# Patient Record
Sex: FEMALE | Race: WHITE | NOT HISPANIC OR LATINO | Employment: OTHER | URBAN - METROPOLITAN AREA
[De-identification: names, ages, dates, MRNs, and addresses within clinical notes are randomized per-mention and may not be internally consistent; named-entity substitution may affect disease eponyms.]

---

## 2023-10-19 ENCOUNTER — HOSPITAL ENCOUNTER (OUTPATIENT)
Dept: CARDIOLOGY | Facility: HOSPITAL | Age: 73
Discharge: HOME | End: 2023-10-19
Payer: MEDICARE

## 2023-10-19 DIAGNOSIS — Z95.818 PRESENCE OF OTHER CARDIAC IMPLANTS AND GRAFTS: ICD-10-CM

## 2023-10-19 DIAGNOSIS — R55 SYNCOPE AND COLLAPSE: ICD-10-CM

## 2023-10-19 DIAGNOSIS — Z95.818 PRESENCE OF OTHER CARDIAC IMPLANTS AND GRAFTS: Primary | ICD-10-CM

## 2023-10-19 PROCEDURE — 93298 REM INTERROG DEV EVAL SCRMS: CPT | Performed by: INTERNAL MEDICINE

## 2023-12-06 ENCOUNTER — OFFICE VISIT (OUTPATIENT)
Dept: CARDIOLOGY | Facility: CLINIC | Age: 73
End: 2023-12-06
Payer: MEDICARE

## 2023-12-06 ENCOUNTER — HOSPITAL ENCOUNTER (OUTPATIENT)
Dept: CARDIOLOGY | Facility: HOSPITAL | Age: 73
Discharge: HOME | End: 2023-12-06
Payer: MEDICARE

## 2023-12-06 VITALS
BODY MASS INDEX: 32.49 KG/M2 | HEART RATE: 59 BPM | SYSTOLIC BLOOD PRESSURE: 118 MMHG | HEIGHT: 61 IN | DIASTOLIC BLOOD PRESSURE: 64 MMHG

## 2023-12-06 DIAGNOSIS — Z95.818 PRESENCE OF OTHER CARDIAC IMPLANTS AND GRAFTS: ICD-10-CM

## 2023-12-06 DIAGNOSIS — F17.200 SMOKER: ICD-10-CM

## 2023-12-06 DIAGNOSIS — R55 SYNCOPE AND COLLAPSE: ICD-10-CM

## 2023-12-06 DIAGNOSIS — Z95.818 STATUS POST PLACEMENT OF IMPLANTABLE LOOP RECORDER: Primary | ICD-10-CM

## 2023-12-06 DIAGNOSIS — T50.1X5D LOOP DIURETIC CAUSING ADVERSE EFFECT IN THERAPEUTIC USE, SUBSEQUENT ENCOUNTER: ICD-10-CM

## 2023-12-06 PROBLEM — E53.8 VITAMIN B12 DEFICIENCY: Status: ACTIVE | Noted: 2019-12-04

## 2023-12-06 PROBLEM — C44.321 SQUAMOUS CELL CARCINOMA OF NOSE: Status: ACTIVE | Noted: 2019-05-09

## 2023-12-06 PROBLEM — I10 ESSENTIAL HYPERTENSION: Status: ACTIVE | Noted: 2019-12-04

## 2023-12-06 PROBLEM — G40.909 EPILEPSY (MULTI): Status: ACTIVE | Noted: 2020-09-28

## 2023-12-06 PROBLEM — R45.4 DIFFICULTY CONTROLLING ANGER: Status: ACTIVE | Noted: 2019-12-18

## 2023-12-06 PROBLEM — G31.84 COGNITIVE IMPAIRMENT, MILD, SO STATED: Status: ACTIVE | Noted: 2019-12-04

## 2023-12-06 PROBLEM — G40.209 PARTIAL EPILEPSY WITH IMPAIRMENT OF CONSCIOUSNESS (MULTI): Status: ACTIVE | Noted: 2023-12-06

## 2023-12-06 PROBLEM — G47.33 OBSTRUCTIVE SLEEP APNEA SYNDROME: Status: ACTIVE | Noted: 2018-11-16

## 2023-12-06 PROBLEM — E55.9 VITAMIN D DEFICIENCY: Status: ACTIVE | Noted: 2019-12-04

## 2023-12-06 PROBLEM — E66.9 OBESITY (BMI 30-39.9): Status: ACTIVE | Noted: 2017-11-29

## 2023-12-06 PROBLEM — E78.2 MIXED HYPERLIPIDEMIA: Status: ACTIVE | Noted: 2019-12-04

## 2023-12-06 PROCEDURE — 93291 INTERROG DEV EVAL SCRMS IP: CPT | Performed by: INTERNAL MEDICINE

## 2023-12-06 PROCEDURE — 3074F SYST BP LT 130 MM HG: CPT | Performed by: INTERNAL MEDICINE

## 2023-12-06 PROCEDURE — 3078F DIAST BP <80 MM HG: CPT | Performed by: INTERNAL MEDICINE

## 2023-12-06 PROCEDURE — 93000 ELECTROCARDIOGRAM COMPLETE: CPT | Performed by: INTERNAL MEDICINE

## 2023-12-06 PROCEDURE — 99214 OFFICE O/P EST MOD 30 MIN: CPT | Performed by: INTERNAL MEDICINE

## 2023-12-06 PROCEDURE — 3008F BODY MASS INDEX DOCD: CPT | Performed by: INTERNAL MEDICINE

## 2023-12-06 PROCEDURE — 93291 INTERROG DEV EVAL SCRMS IP: CPT

## 2023-12-06 RX ORDER — ERGOCALCIFEROL 1.25 MG/1
1.25 CAPSULE ORAL
COMMUNITY

## 2023-12-06 RX ORDER — NITROGLYCERIN 0.4 MG/1
0.4 TABLET SUBLINGUAL
COMMUNITY
Start: 2022-02-28

## 2023-12-06 RX ORDER — ASPIRIN 81 MG/1
1 TABLET ORAL DAILY
COMMUNITY

## 2023-12-06 RX ORDER — AMLODIPINE BESYLATE 5 MG/1
5 TABLET ORAL DAILY
COMMUNITY

## 2023-12-06 RX ORDER — LANOLIN ALCOHOL/MO/W.PET/CERES
1000 CREAM (GRAM) TOPICAL
COMMUNITY

## 2023-12-06 RX ORDER — ALBUTEROL SULFATE 90 UG/1
2 AEROSOL, METERED RESPIRATORY (INHALATION) EVERY 6 HOURS PRN
COMMUNITY
Start: 2022-11-29

## 2023-12-06 RX ORDER — BUDESONIDE AND FORMOTEROL FUMARATE DIHYDRATE 160; 4.5 UG/1; UG/1
2 AEROSOL RESPIRATORY (INHALATION) 2 TIMES DAILY
COMMUNITY
Start: 2023-09-27

## 2023-12-06 RX ORDER — LEVETIRACETAM 750 MG/1
2250 TABLET ORAL 2 TIMES DAILY
COMMUNITY
Start: 2023-03-10

## 2023-12-06 NOTE — PROGRESS NOTES
CARDIOLOGY OFFICE VISIT      CHIEF COMPLAINT  Chief Complaint   Patient presents with    Syncope       HISTORY OF PRESENT ILLNESS  HPI    73-year-old  female who was admitted to [facility] on August 4, 2023 with syncope. She underwent implantation of a loop recorder on August 7, 2023 and presents to the office today for follow-up evaluation. She states that she has had several episodes of mac syncope. She denies dizziness or lightheadedness, palpitations, chest pain, or shortness of breath prior to the syncopal episodes. She underwent a CT of the head which was negative for any acute pathology. She did undergo a Lexiscan stress test on August 7, 2023 which revealed an ejection fraction of 80% with no acute ischemic changes or infarct patterns.    She states that she has been doing well since the last office visit.  She denies any symptoms of chest pain or shortness of breath or palpitations.    EKG performed today shows sinus bradycardia with PACs at a rate of 59 bpm QRS ration 74 ms QT corrected 394 ms.  Rhythm strip shows the same pattern.    Personal review the results of the loop recorder interrogation since the last office visit.  Loop recorder has not seen any significant atrial or ventricular or tacky or bradycardia arrhythmias.              Past Medical History  Past Medical History:   Diagnosis Date    Obstructive sleep apnea (adult) (pediatric)     Obstructive sleep apnea    Other conditions influencing health status     Arthritis    Personal history of other diseases of the circulatory system     History of hypertension    Personal history of other endocrine, nutritional and metabolic disease     History of hyperlipidemia    Personal history of other mental and behavioral disorders     History of depression       Social History  Social History     Tobacco Use    Smoking status: Not on file    Smokeless tobacco: Not on file   Substance Use Topics    Alcohol use: Not on file     Drug use: Not on file       Family History   No family history on file.     Allergies:  Allergies   Allergen Reactions    Penicillins Rash     Remote rash    Azithromycin Unknown     C-diff    Sulfa (Sulfonamide Antibiotics) Unknown     bloodclots, diarrhea from bactrim    Zonisamide Rash        Outpatient Medications:  Current Outpatient Medications   Medication Instructions    albuterol 90 mcg/actuation inhaler 2 puffs, miscellaneous, Every 6 hours PRN    amLODIPine (NORVASC) 5 mg, oral, Daily    aspirin 81 mg EC tablet 1 tablet, oral, Daily    budesonide-formoteroL (Symbicort) 160-4.5 mcg/actuation inhaler 2 puffs, inhalation, 2 times daily    cyanocobalamin (VITAMIN B-12) 1,000 mcg, oral, Daily RT    ergocalciferol (VITAMIN D-2) 1.25 mg, oral, Weekly    levETIRAcetam (KEPPRA) 2,250 mg, oral, 2 times daily    nitroglycerin (NITROSTAT) 0.4 mg, sublingual          REVIEW OF SYSTEMS  Review of Systems   All other systems reviewed and are negative.        VITALS  Vitals:    12/06/23 1314   BP: 118/64   Pulse: 59       PHYSICAL EXAM  Constitutional:       Appearance: Healthy appearance. Not in distress.   Neck:      Vascular: No JVR. JVD normal.   Pulmonary:      Effort: Pulmonary effort is normal.      Breath sounds: Normal breath sounds. No wheezing. No rhonchi. No rales.   Chest:      Chest wall: Not tender to palpatation.   Cardiovascular:      PMI at left midclavicular line. Normal rate. Regular rhythm. Normal S1. Normal S2.       Murmurs: There is no murmur.      No gallop.  No click. No rub.      Comments: Device in the left prepectoral healing well.  No signs of hematoma or infection.     Pulses:     Intact distal pulses.   Edema:     Peripheral edema absent.   Abdominal:      General: Bowel sounds are normal.      Palpations: Abdomen is soft.      Tenderness: There is no abdominal tenderness.   Musculoskeletal: Normal range of motion.         General: No tenderness. Skin:     General: Skin is warm and dry.    Neurological:      General: No focal deficit present.      Mental Status: Alert and oriented to person, place and time.           ASSESSMENT AND PLAN    Clinical impressions:  1. Syncope with collapse of unknown etiology.  2. Seizure disorder on Keppra.  3. Loop recorder implant (Medtronic Linq 2) on August 7, 2023 for evaluation of syncope.  4. Obstructive sleep apnea status post inspire implant in the right subclavian space.  5. Hypertension controlled with a blood pressure today 118/80.  6. Orthostatic hypotension with 2 24 mmHg drop in systolic blood pressure from lying to sitting.  7. Dyslipidemia, previously on atorvastatin. Patient reports she is not taking this medication at this time per review of medications.  8. Tobacco dependency.  9. Alcohol use.  10. Esophageal stricture status post dilation in 2018.  11. Negative Lexiscan stress test dated August 7, 2023 for ischemic changes or infarct patterns with hyperdynamic left ventricular function with an ejection fraction of 80%.    Plan-recommendations    From the electrophysiologist on point she is doing well.  Will continue follow-up in our office every 6 months or sooner needed.    Patient is to have close follow-up with neurology service for seizure disorders.    Follow-up with device clinic as scheduled.    Risk factor modification and lifestyle modification discussed with patient. Diet , exercise and hydration discussed with patient.      I have personally review with patient during this office visit, laboratory data, echocardiogram results, stress test results, Holter-event monitor results prior and after the last electrophysiology visit. All questions has been answered.    Please excuse any errors in grammar or translation related to this dictation.  Voice recognition software was utilized to prepare this document.  .

## 2023-12-06 NOTE — PATIENT INSTRUCTIONS
Continue same medications/treatment.  Patient educated on proper medication use.  Patient educated on risk factor modification.  Please bring any lab results from other providers/physicians to your next appointment.    Please bring all medicines, vitamins, and herbal supplements with you when you come to the office.    Prescriptions will not be filled unless you are compliant with your follow up appointments or have a follow up appointment scheduled as per instruction of your physician. Refills should be requested at the time of your visit.    Follow up with Taty in 6 months with device check  Follow up with Dr. Bautista in 12 months with device check  Continue monthly remote checks of loop recorder    FREDERICK COOK RN, AM SCRIBING FOR, AND IN THE PRESENCE OF DR. IMAN BAUTISTA MD

## 2024-02-26 RX ORDER — VIT C/E/ZN/COPPR/LUTEIN/ZEAXAN 250MG-90MG
25 CAPSULE ORAL DAILY
COMMUNITY

## 2024-04-26 ENCOUNTER — HOSPITAL ENCOUNTER (OUTPATIENT)
Dept: CARDIOLOGY | Facility: HOSPITAL | Age: 74
Discharge: HOME | End: 2024-04-26
Payer: MEDICARE

## 2024-04-26 DIAGNOSIS — R55 SYNCOPE AND COLLAPSE: ICD-10-CM

## 2024-04-26 DIAGNOSIS — Z95.818 PRESENCE OF OTHER CARDIAC IMPLANTS AND GRAFTS: ICD-10-CM

## 2024-04-26 PROCEDURE — 93298 REM INTERROG DEV EVAL SCRMS: CPT

## 2024-04-26 PROCEDURE — 93298 REM INTERROG DEV EVAL SCRMS: CPT | Performed by: INTERNAL MEDICINE

## 2024-05-31 ENCOUNTER — HOSPITAL ENCOUNTER (OUTPATIENT)
Dept: CARDIOLOGY | Facility: HOSPITAL | Age: 74
Discharge: HOME | End: 2024-05-31
Payer: MEDICARE

## 2024-05-31 DIAGNOSIS — Z95.818 STATUS POST PLACEMENT OF IMPLANTABLE LOOP RECORDER: ICD-10-CM

## 2024-05-31 PROCEDURE — 93298 REM INTERROG DEV EVAL SCRMS: CPT

## 2024-05-31 PROCEDURE — 93298 REM INTERROG DEV EVAL SCRMS: CPT | Performed by: INTERNAL MEDICINE

## 2024-06-06 ENCOUNTER — APPOINTMENT (OUTPATIENT)
Dept: CARDIOLOGY | Facility: CLINIC | Age: 74
End: 2024-06-06
Payer: MEDICARE

## 2024-06-06 ENCOUNTER — APPOINTMENT (OUTPATIENT)
Dept: CARDIOLOGY | Facility: HOSPITAL | Age: 74
End: 2024-06-06
Payer: MEDICARE

## 2024-07-02 ENCOUNTER — APPOINTMENT (OUTPATIENT)
Dept: CARDIOLOGY | Facility: CLINIC | Age: 74
End: 2024-07-02
Payer: MEDICARE

## 2024-07-08 ENCOUNTER — APPOINTMENT (OUTPATIENT)
Dept: CARDIOLOGY | Facility: HOSPITAL | Age: 74
End: 2024-07-08
Payer: MEDICARE

## 2024-07-24 ENCOUNTER — APPOINTMENT (OUTPATIENT)
Dept: CARDIOLOGY | Facility: CLINIC | Age: 74
End: 2024-07-24
Payer: MEDICARE

## 2024-07-24 ENCOUNTER — APPOINTMENT (OUTPATIENT)
Dept: CARDIOLOGY | Facility: HOSPITAL | Age: 74
End: 2024-07-24
Payer: MEDICARE

## 2024-07-24 VITALS
BODY MASS INDEX: 32.49 KG/M2 | SYSTOLIC BLOOD PRESSURE: 118 MMHG | DIASTOLIC BLOOD PRESSURE: 64 MMHG | HEIGHT: 61 IN | HEART RATE: 70 BPM

## 2024-07-24 DIAGNOSIS — E78.2 MIXED HYPERLIPIDEMIA: ICD-10-CM

## 2024-07-24 DIAGNOSIS — I10 ESSENTIAL HYPERTENSION: ICD-10-CM

## 2024-07-24 DIAGNOSIS — Z95.818 STATUS POST PLACEMENT OF IMPLANTABLE LOOP RECORDER: Primary | ICD-10-CM

## 2024-07-24 DIAGNOSIS — E78.00 HYPERCHOLESTEROLEMIA: ICD-10-CM

## 2024-07-24 PROCEDURE — 99215 OFFICE O/P EST HI 40 MIN: CPT | Performed by: NURSE PRACTITIONER

## 2024-07-24 PROCEDURE — 93000 ELECTROCARDIOGRAM COMPLETE: CPT | Mod: DISTINCT PROCEDURAL SERVICE | Performed by: INTERNAL MEDICINE

## 2024-07-24 PROCEDURE — 1160F RVW MEDS BY RX/DR IN RCRD: CPT | Performed by: NURSE PRACTITIONER

## 2024-07-24 PROCEDURE — 3074F SYST BP LT 130 MM HG: CPT | Performed by: NURSE PRACTITIONER

## 2024-07-24 PROCEDURE — 1159F MED LIST DOCD IN RCRD: CPT | Performed by: NURSE PRACTITIONER

## 2024-07-24 PROCEDURE — 3078F DIAST BP <80 MM HG: CPT | Performed by: NURSE PRACTITIONER

## 2024-07-24 RX ORDER — OXCARBAZEPINE 150 MG/1
150 TABLET, FILM COATED ORAL 2 TIMES DAILY
COMMUNITY
Start: 2024-06-14 | End: 2025-06-14

## 2024-07-24 NOTE — PROGRESS NOTES
"CARDIOLOGY OFFICE VISIT      CHIEF COMPLAINT  Chief Complaint   Patient presents with    Follow-up     6 month following up with device check      Complaint: \"I had chest pain the other day and took a couple of nitros.\"  HISTORY OF PRESENT ILLNESS  HPI  History: The patient is a 74-year-old  female who is followed for syncope status post loop recorder implant on August 7, 2023 and seizure disorder followed by neurology at the [facility].  She presents to the office today stating 2 days ago she had an episode of midsternal chest pressure with no associated symptoms.  She did take 2 nitroglycerin tablets with relief.  She has had no additional episodes since that time.  She states she does occasionally experience transient dizziness and lightheadedness with no near or mac syncopal episodes.  She reports she underwent evaluation for seizure disorder at the [facility] in January, 2024.  On June 14, 2024 the patient underwent an EEG and MRI through the [facility].  The patient reports that she does experience episodes of absence seizures.  She states she is no longer driving.  She denies palpitations, shortness of breath, abdominal distention, or orthopnea.  She is complaining of left hip pain and left lower extremity swelling.  Past Medical History  Past Medical History:   Diagnosis Date    Obstructive sleep apnea (adult) (pediatric)     Obstructive sleep apnea    Other conditions influencing health status     Arthritis    Personal history of other diseases of the circulatory system     History of hypertension    Personal history of other endocrine, nutritional and metabolic disease     History of hyperlipidemia    Personal history of other mental and behavioral disorders     History of depression       Social History  Social History     Tobacco Use    Smoking status: Every Day     Types: Cigarettes    Smokeless tobacco: Never   Substance Use Topics    Alcohol use: Not Currently     Comment: " rarely    Drug use: Yes     Types: Marijuana     Comment: occa       Family History     Family History   Problem Relation Name Age of Onset    Epilepsy Mother      Hypertension Father      Coronary artery disease Brother          Allergies:  Allergies   Allergen Reactions    Penicillins Rash     Remote rash    Azithromycin Unknown     C-diff    Sulfa (Sulfonamide Antibiotics) Unknown     bloodclots, diarrhea from bactrim    Zonisamide Rash        Outpatient Medications:  Current Outpatient Medications   Medication Instructions    albuterol 90 mcg/actuation inhaler 2 puffs, miscellaneous, Every 6 hours PRN    amLODIPine (NORVASC) 5 mg, oral, Daily    budesonide-formoteroL (Symbicort) 160-4.5 mcg/actuation inhaler 2 puffs, inhalation, 2 times daily    cholecalciferol (VITAMIN D3) 25 mcg, oral, Daily, EVERY OTHER DAY     cyanocobalamin (VITAMIN B-12) 1,000 mcg, oral, Daily RT    levETIRAcetam (KEPPRA) 2,250 mg, oral, 2 times daily    nitroglycerin (NITROSTAT) 0.4 mg, sublingual    OXcarbazepine (TRILEPTAL) 150 mg, oral, 2 times daily    sertraline HCl (SERTRALINE ORAL) 100 mg, oral, 2 times daily, TAKE 1 TABLET TWICE DAILY          REVIEW OF SYSTEMS  Review of Systems   All other systems reviewed and are negative.        VITALS  Vitals:    07/24/24 1057   BP: 118/64   Pulse: 70       PHYSICAL EXAM  Vitals and nursing note reviewed.   Constitutional:       Appearance: Normal appearance.   HENT:      Head: Normocephalic.   Neck:      Vascular: No JVD. Carotid upstrokes II/IV.  Cardiovascular:      Rate and Rhythm: Normal rate and regular rhythm.      Pulses: Normal pulses.      Heart sounds: Normal S1 S2, no S3 S4.  No murmurs or rubs.     Trace to 1+ left lower extremity edema, no edema on the right.  Pulmonary:      Effort: Pulmonary effort is normal. Respirations regular and nonlabored.     Breath sounds: Clear to auscultation posterior laterally.  Abdominal:      General: Bowel sounds are normal.      Palpations:  Abdomen is soft.   Musculoskeletal:         General: Normal range of motion.      Cervical back: Normal range of motion.   Skin:     General: Skin is warm and dry.  Left parasternal loop recorder pocket is well-healed without redness swelling or drainage.  Neurological:      General: No focal deficit present.      Mental Status: Alert and oriented to person, place, and time.      Motor: Motor function is intact.   Psychiatric:         Attention and Perception: Attention and perception normal.         Mood and Affect: Mood and affect normal.         Speech: Speech normal.         Behavior: Behavior normal. Behavior is cooperative.         Thought Content: Thought content normal.         Cognition and Memory: Cognition and memory normal.     Labs and testing: Twelve-lead EKG reveals sinus rhythm without ectopics and no acute ischemic changes.  QRS duration 66 ms,  ms, QTc 401 ms.  Loop recorder interrogation performed in the office today revealed 1 episode of tachycardia on June 14, 2024 at 4:23 PM lasting 2 minutes and 13 seconds with a maximum heart rate of 250 bpm.  Review of the electrograms reveals tachycardia with noise/artifact.  The strip is uninterpretable.  Carotid duplex scan dated September 9, 2023 revealed 50 to 69% bilateral internal carotid artery stenosis.      ASSESSMENT AND PLAN    Clinical impressions:  1. Syncope with collapse of unknown etiology.  2. Seizure disorder on Kera.  3. Loop recorder implant (Digital H2Otronic Linq 2) on August 7, 2023 for evaluation of syncope.  4. Obstructive sleep apnea status post inspire implant in the right subclavian space.  5. Hypertension controlled with a blood pressure today 118/64.  6. Orthostatic hypotension with 2 24 mmHg drop in systolic blood pressure from lying to sitting.  7. Dyslipidemia, previously on atorvastatin. Patient reports she is not taking this medication at this time per review of medications.  8. Tobacco dependency.  9. Alcohol use.  10.  Esophageal stricture status post dilation in 2018.  11. Negative Lexiscan stress test dated August 7, 2023 for ischemic changes or infarct patterns with hyperdynamic left ventricular function with an ejection fraction of 80%.     Recommendations:  1.  I reviewed the findings on the loop recorder.  The time of the arrhythmia/noise was during the EEG at the [facility].  The patient states that she was off her seizure medication and may have had a seizure at that time.  No additional arrhythmic events were noted.  2.  Patient states that she does not understand her follow-ups for the loop recorder.  She states that she is receiving messages from someone instructing her to perform of device check.  I contacted the device clinic.  They state the patient is not connecting with her remote monitor.  I requested the device clinic staff contact the patient on the phone and give her instructions as the patient was extremely confused during today's office visit.  Patient will undergo an in clinic check in 6 months prior to the office visit with Dr. Alexander.  3.  Follow-up in office with Dr. Alexander in 6 months or sooner if needed.  4.  In review of the medication list.  The patient states that she believes she is still taking atorvastatin.  The documentation from the office visit with Dr. Alexander states the patient reported she had discontinued the medication.  Patient states that is not true.  We then reviewed all of the patient's medications and their indications.  The patient stated she is not taking the amlodipine then later in the discussion she stated she was taking the amlodipine.  The patient was provided with a list of the medications from [facility] and instructed to compare the list with her bottles at home and contact me at [phone] with an updated list.  At the close of office today the patient still had not contacted me regarding updating her medication list.    Evaluation and note by Taty Lara  CNP  **Please excuse any errors in grammar or translation related to this dictation.  Voice recognition software was utilized to prepare this document.**

## 2025-01-29 ENCOUNTER — TELEPHONE (OUTPATIENT)
Dept: CARDIOLOGY | Facility: CLINIC | Age: 75
End: 2025-01-29

## 2025-01-29 ENCOUNTER — HOSPITAL ENCOUNTER (OUTPATIENT)
Dept: CARDIOLOGY | Facility: HOSPITAL | Age: 75
Discharge: HOME | End: 2025-01-29
Payer: MEDICARE

## 2025-01-29 ENCOUNTER — APPOINTMENT (OUTPATIENT)
Dept: CARDIOLOGY | Facility: CLINIC | Age: 75
End: 2025-01-29
Payer: MEDICARE

## 2025-01-29 DIAGNOSIS — Z95.818 STATUS POST PLACEMENT OF IMPLANTABLE LOOP RECORDER: ICD-10-CM

## 2025-01-29 PROCEDURE — 93291 INTERROG DEV EVAL SCRMS IP: CPT | Performed by: INTERNAL MEDICINE

## 2025-01-29 PROCEDURE — 93291 INTERROG DEV EVAL SCRMS IP: CPT

## 2025-01-29 NOTE — TELEPHONE ENCOUNTER
Pt. Called to reschedule with Dr. Alexander.  New order for device chec in 6 months placed.  Antonio